# Patient Record
Sex: MALE
[De-identification: names, ages, dates, MRNs, and addresses within clinical notes are randomized per-mention and may not be internally consistent; named-entity substitution may affect disease eponyms.]

---

## 2018-11-28 ENCOUNTER — HOSPITAL ENCOUNTER (OUTPATIENT)
Dept: HOSPITAL 47 - RADUSMAIN | Age: 48
End: 2018-11-28
Attending: FAMILY MEDICINE
Payer: COMMERCIAL

## 2018-11-28 DIAGNOSIS — N42.89: Primary | ICD-10-CM

## 2018-11-28 PROCEDURE — 76872 US TRANSRECTAL: CPT

## 2018-11-28 NOTE — US
EXAMINATION TYPE: US prostate transrectal

 

DATE OF EXAM: 11/28/2018

 

COMPARISON: NONE

 

CLINICAL HISTORY: R36.1 Hematospermia. Patient states last week had to episodes of bright red sperm, 
 about 2 days apart.  It is currently clear.  Patient states he is on testosterone shots and a vasodi
lator pill. 

 

This examination was performed using the transrectal probe. 

 

EXAM MEASUREMENTS:

 

Gland Size: 5.0 x 4.5 x 2.3 cm

Volume: 26.94 ml

Predicted PSA:  0.03

Actual PSA (if available):0.9

 

No prominent lesions or masses seen in peripheral zone.  Multiple calcifications seen in central zone
.   

 

 

 

IMPRESSION:  Multiple Central glandular calcifications. Gland is of normal size. No suspicious lesion
s are identified.

 

 

Predicted PSA = volume x 0.12 ng/ml

Calculated Volume = 0.5236 x L x W x H

## 2018-12-19 ENCOUNTER — HOSPITAL ENCOUNTER (OUTPATIENT)
Dept: HOSPITAL 47 - RADUSWWP | Age: 48
Discharge: HOME | End: 2018-12-19
Attending: FAMILY MEDICINE
Payer: COMMERCIAL

## 2018-12-19 DIAGNOSIS — R36.1: Primary | ICD-10-CM

## 2018-12-19 PROCEDURE — 76870 US EXAM SCROTUM: CPT

## 2018-12-19 PROCEDURE — 93975 VASCULAR STUDY: CPT

## 2018-12-19 NOTE — US
EXAMINATION TYPE: US scrotum with doppler.  Grayscale and color Doppler Duplex imaging performed of tequila simmons scrotum.

 

DATE OF EXAM: 12/19/2018

 

COMPARISON: NONE

 

CLINICAL HISTORY: R36.1 HEMATOSPERMIA. patient states having a hx of lump on left side, no previous u
ltrasound

 

 

EXAM MEASUREMENTS:

 

TESTICLES:

Right Testicle:  3.3 x 3.8 x 2.0 cm

Left Testicle:  4.4 x 3.3 x 2.1 cm

 

EPIDIDYMIS HEAD:

Right Epididymis:  0.7 x 0.9 x 0.7  cm

Left Epididymis:  0.8 x 1.1 x 0.8 cm  

 

Doppler performed to assess for testicular vascularity; good bilateral color flow and waveforms are s
een.   There is no evidence of testicular torsion.

 

Presence of hydroceles:  small bilateral

Presence of varicoceles:  no

 

On left epididymal head, cystic cluster seen - 0.7 x 0.7 x 0.8 cm

 

 

 

IMPRESSION: Some adjacent small epididymal cysts or spermatoceles are seen in the left-sided epididym
al head. No suspicious intratesticular masses are noted bilaterally.